# Patient Record
Sex: MALE
[De-identification: names, ages, dates, MRNs, and addresses within clinical notes are randomized per-mention and may not be internally consistent; named-entity substitution may affect disease eponyms.]

---

## 2017-03-03 ENCOUNTER — APPOINTMENT (OUTPATIENT)
Dept: RADIATION ONCOLOGY | Facility: CLINIC | Age: 82
End: 2017-03-03

## 2017-03-03 VITALS
SYSTOLIC BLOOD PRESSURE: 150 MMHG | DIASTOLIC BLOOD PRESSURE: 80 MMHG | OXYGEN SATURATION: 97 % | HEIGHT: 66 IN | WEIGHT: 156.2 LBS | RESPIRATION RATE: 16 BRPM | BODY MASS INDEX: 25.1 KG/M2 | TEMPERATURE: 96.8 F | HEART RATE: 72 BPM

## 2017-03-03 VITALS — DIASTOLIC BLOOD PRESSURE: 70 MMHG | SYSTOLIC BLOOD PRESSURE: 130 MMHG

## 2017-03-06 ENCOUNTER — RESULT REVIEW (OUTPATIENT)
Age: 82
End: 2017-03-06

## 2017-05-03 ENCOUNTER — RESULT REVIEW (OUTPATIENT)
Age: 82
End: 2017-05-03

## 2017-06-14 ENCOUNTER — APPOINTMENT (OUTPATIENT)
Dept: RADIATION ONCOLOGY | Facility: CLINIC | Age: 82
End: 2017-06-14
Payer: MEDICARE

## 2017-06-14 VITALS
DIASTOLIC BLOOD PRESSURE: 72 MMHG | SYSTOLIC BLOOD PRESSURE: 118 MMHG | WEIGHT: 155.29 LBS | HEART RATE: 62 BPM | OXYGEN SATURATION: 95 % | RESPIRATION RATE: 16 BRPM | BODY MASS INDEX: 25.07 KG/M2

## 2017-06-14 PROCEDURE — 99213 OFFICE O/P EST LOW 20 MIN: CPT

## 2017-06-14 RX ORDER — PHENAZOPYRIDINE 200 MG/1
200 TABLET, FILM COATED ORAL 3 TIMES DAILY
Qty: 42 | Refills: 0 | Status: DISCONTINUED | COMMUNITY
Start: 2017-02-14 | End: 2017-06-14

## 2017-09-11 ENCOUNTER — APPOINTMENT (OUTPATIENT)
Dept: RADIATION ONCOLOGY | Facility: CLINIC | Age: 82
End: 2017-09-11
Payer: MEDICARE

## 2017-09-11 VITALS
OXYGEN SATURATION: 97 % | WEIGHT: 156.53 LBS | HEART RATE: 63 BPM | DIASTOLIC BLOOD PRESSURE: 77 MMHG | RESPIRATION RATE: 16 BRPM | SYSTOLIC BLOOD PRESSURE: 138 MMHG | BODY MASS INDEX: 25.26 KG/M2

## 2017-09-11 PROCEDURE — 99213 OFFICE O/P EST LOW 20 MIN: CPT | Mod: GC

## 2017-09-14 LAB — PSA SERPL-MCNC: 0.25 NG/ML

## 2017-12-22 ENCOUNTER — APPOINTMENT (OUTPATIENT)
Dept: UROLOGY | Facility: CLINIC | Age: 82
End: 2017-12-22
Payer: MEDICARE

## 2017-12-22 VITALS
DIASTOLIC BLOOD PRESSURE: 82 MMHG | SYSTOLIC BLOOD PRESSURE: 149 MMHG | TEMPERATURE: 97.8 F | HEART RATE: 73 BPM | RESPIRATION RATE: 16 BRPM

## 2017-12-22 PROCEDURE — 99213 OFFICE O/P EST LOW 20 MIN: CPT

## 2018-03-12 ENCOUNTER — APPOINTMENT (OUTPATIENT)
Dept: RADIATION ONCOLOGY | Facility: CLINIC | Age: 83
End: 2018-03-12

## 2018-03-26 ENCOUNTER — OUTPATIENT (OUTPATIENT)
Dept: OUTPATIENT SERVICES | Facility: HOSPITAL | Age: 83
LOS: 1 days | Discharge: HOME | End: 2018-03-26

## 2018-03-26 ENCOUNTER — APPOINTMENT (OUTPATIENT)
Dept: RADIATION ONCOLOGY | Facility: CLINIC | Age: 83
End: 2018-03-26
Payer: MEDICARE

## 2018-03-26 VITALS
DIASTOLIC BLOOD PRESSURE: 76 MMHG | WEIGHT: 150 LBS | TEMPERATURE: 97.7 F | RESPIRATION RATE: 15 BRPM | BODY MASS INDEX: 24.21 KG/M2 | OXYGEN SATURATION: 98 % | HEART RATE: 68 BPM | SYSTOLIC BLOOD PRESSURE: 154 MMHG

## 2018-03-26 PROCEDURE — 99213 OFFICE O/P EST LOW 20 MIN: CPT | Mod: GC

## 2018-03-26 RX ORDER — HYDROCODONE BITARTRATE AND ACETAMINOPHEN 5; 325 MG/1; MG/1
5-325 TABLET ORAL
Qty: 10 | Refills: 0 | Status: DISCONTINUED | COMMUNITY
Start: 2017-08-16

## 2018-03-28 LAB — PSA SERPL-MCNC: 0.15 NG/ML

## 2018-06-15 ENCOUNTER — APPOINTMENT (OUTPATIENT)
Dept: UROLOGY | Facility: CLINIC | Age: 83
End: 2018-06-15

## 2018-09-12 ENCOUNTER — APPOINTMENT (OUTPATIENT)
Dept: NEUROSURGERY | Facility: CLINIC | Age: 83
End: 2018-09-12
Payer: MEDICARE

## 2018-09-12 DIAGNOSIS — C80.1 MALIGNANT (PRIMARY) NEOPLASM, UNSPECIFIED: ICD-10-CM

## 2018-09-12 PROCEDURE — 99203 OFFICE O/P NEW LOW 30 MIN: CPT

## 2018-09-13 VITALS — DIASTOLIC BLOOD PRESSURE: 80 MMHG | HEART RATE: 68 BPM | RESPIRATION RATE: 18 BRPM | SYSTOLIC BLOOD PRESSURE: 124 MMHG

## 2018-09-13 PROBLEM — C80.1 CANCER: Status: RESOLVED | Noted: 2018-09-13 | Resolved: 2018-09-13

## 2018-09-18 ENCOUNTER — APPOINTMENT (OUTPATIENT)
Dept: RADIATION ONCOLOGY | Facility: CLINIC | Age: 83
End: 2018-09-18
Payer: MEDICARE

## 2018-09-18 VITALS
BODY MASS INDEX: 24.11 KG/M2 | OXYGEN SATURATION: 96 % | DIASTOLIC BLOOD PRESSURE: 76 MMHG | HEIGHT: 66 IN | SYSTOLIC BLOOD PRESSURE: 128 MMHG | RESPIRATION RATE: 18 BRPM | WEIGHT: 150 LBS | HEART RATE: 70 BPM | TEMPERATURE: 98.2 F

## 2018-09-18 PROCEDURE — 99213 OFFICE O/P EST LOW 20 MIN: CPT | Mod: GC

## 2018-09-20 LAB
PSA FREE FLD-MCNC: 11.8
PSA FREE SERPL-MCNC: 0.02 NG/ML
PSA SERPL-MCNC: 0.17 NG/ML

## 2018-10-12 ENCOUNTER — APPOINTMENT (OUTPATIENT)
Dept: UROLOGY | Facility: CLINIC | Age: 83
End: 2018-10-12
Payer: MEDICARE

## 2018-10-12 PROCEDURE — 99213 OFFICE O/P EST LOW 20 MIN: CPT

## 2018-10-17 LAB
APPEARANCE: CLEAR
BACTERIA: NEGATIVE
BILIRUBIN URINE: NEGATIVE
BLOOD URINE: ABNORMAL
COLOR: YELLOW
GLUCOSE QUALITATIVE U: NEGATIVE MG/DL
KETONES URINE: NEGATIVE
LEUKOCYTE ESTERASE URINE: NEGATIVE
MICROSCOPIC-UA: NORMAL
NITRITE URINE: NEGATIVE
PH URINE: 6.5
PROTEIN URINE: NEGATIVE MG/DL
RED BLOOD CELLS URINE: 0 /HPF
SPECIFIC GRAVITY URINE: 1.01
SQUAMOUS EPITHELIAL CELLS: 0 /HPF
UROBILINOGEN URINE: NEGATIVE MG/DL
WHITE BLOOD CELLS URINE: 0 /HPF

## 2019-10-25 ENCOUNTER — APPOINTMENT (OUTPATIENT)
Dept: UROLOGY | Facility: CLINIC | Age: 84
End: 2019-10-25
Payer: MEDICARE

## 2019-10-25 PROCEDURE — 99213 OFFICE O/P EST LOW 20 MIN: CPT

## 2019-10-25 NOTE — ASSESSMENT
Detail Level: Generalized [FreeTextEntry1] : patient with hx of pca\par stable psa : 0.11 with normal urine\par no LUTS\par cont annual f/u  Detail Level: Detailed Detail Level: Zone Detail Level: Simple

## 2019-10-25 NOTE — PHYSICAL EXAM
[General Appearance - Well Developed] : well developed [General Appearance - Well Nourished] : well nourished [Normal Appearance] : normal appearance [Well Groomed] : well groomed [General Appearance - In No Acute Distress] : no acute distress [Abdomen Soft] : soft [Abdomen Tenderness] : non-tender [] : no hepato-splenomegaly [Abdomen Hernia] : no hernia was discovered [Abdomen Mass (___ Cm)] : no abdominal mass palpated [Costovertebral Angle Tenderness] : no ~M costovertebral angle tenderness [FreeTextEntry1] : pvr--zero [Cervical Lymph Nodes Enlarged Posterior Bilaterally] : posterior cervical [Supraclavicular Lymph Nodes Enlarged Bilaterally] : supraclavicular [Cervical Lymph Nodes Enlarged Anterior Bilaterally] : anterior cervical

## 2020-10-19 ENCOUNTER — APPOINTMENT (OUTPATIENT)
Dept: RADIATION ONCOLOGY | Facility: CLINIC | Age: 85
End: 2020-10-19
Payer: MEDICARE

## 2020-10-19 VITALS
HEART RATE: 70 BPM | DIASTOLIC BLOOD PRESSURE: 74 MMHG | RESPIRATION RATE: 16 BRPM | SYSTOLIC BLOOD PRESSURE: 128 MMHG | OXYGEN SATURATION: 97 %

## 2020-10-19 PROCEDURE — 99213 OFFICE O/P EST LOW 20 MIN: CPT | Mod: GC

## 2020-10-19 NOTE — REVIEW OF SYSTEMS
[Nocturia] : nocturia [EPIC-CP Score (0-60): ___] : EPIC-CP score: [unfilled] [IPSS Score (0-40): ___] : IPSS score: [unfilled] [Negative] : Heme/Lymph [Hematuria: Grade 0] : Hematuria: Grade 0 [Urinary Incontinence: Grade 0] : Urinary Incontinence: Grade 0  [Urinary Retention: Grade 0] : Urinary Retention: Grade 0 [Urinary Tract Pain: Grade 0] : Urinary Tract Pain: Grade 0 [Urinary Urgency: Grade 0] : Urinary Urgency: Grade 0 [Erectile Dysfunction: Grade 0] : Erectile Dysfunction: Grade 0 [Ejaculation Disorder: Grade 0] : Ejaculation Disorder: Grade 0 [Urinary Frequency: Grade 0] : Urinary Frequency: Grade 0 [Constipation: Grade 0] : Constipation: Grade 0 [Diarrhea: Grade 0] : Diarrhea: Grade 0

## 2020-10-20 NOTE — DISEASE MANAGEMENT
[1] : T1 [c] : c [0] : M0 [10-20] : 10 - 20 ng/mL [6] : [place] Score 6 [Radiation Therapy] : Radiation Therapy [Clinical] : TNM Stage: c [II] : II [NTNM] : 0 [TTNM] : 1c [MTNM] : 0

## 2020-10-20 NOTE — DATA REVIEWED
[FreeTextEntry1] : PSA (ng/mL)\par \par 10/02/20  0.09\par 6/2019  0.11\par 3/26/18 0.15\par 9/14/17 0.25\par 02/03/17 0.47\par 08/25/16 0.6\par 06/15/16 0.73 \par 09/02/15 3.07.

## 2020-10-20 NOTE — HISTORY OF PRESENT ILLNESS
[FreeTextEntry1] : Mr. Sinha is a 85 year old male with a history of clinical stage II, [id number] adenocarcinoma of the prostate with a Madelyn score of 3+3 and a pretreatment PSA of 11.4 ng/ml. He underwent definitive radiation therapy with SBRT. A dose of 4250cGy was given in five fractions and completed on 8/7/15. \par \par He presents for follow-up, last seen here 2 years ago. His most recent ultrasensitive PSA on 10/2/20 was 0.09 ng/mL (previous .11ng/mL). He is feeling well without urinary frequency, urgency, dysuria or hematuria. His bowels are regular, without rectal pain, bleeding or discharge. Denies erectile dysfunction.

## 2020-10-20 NOTE — PHYSICAL EXAM
[General Appearance - Well Developed] : well developed [Sclera] : the sclera and conjunctiva were normal [Nondistended] : nondistended [] : no respiratory distress [Abdomen Tenderness] : non-tender [Oriented To Time, Place, And Person] : oriented to person, place, and time [Outer Ear] : the ears and nose were normal in appearance [Abdomen Soft] : soft [No Focal Deficits] : no focal deficits [Heart Rate And Rhythm] : heart rate and rhythm were normal [Normal] : no joint swelling, no clubbing or cyanosis of the fingernails and muscle strength and tone were normal

## 2022-10-21 ENCOUNTER — APPOINTMENT (OUTPATIENT)
Dept: INTERNAL MEDICINE | Facility: CLINIC | Age: 87
End: 2022-10-21

## 2022-10-21 VITALS
WEIGHT: 138 LBS | BODY MASS INDEX: 22.18 KG/M2 | HEART RATE: 88 BPM | HEIGHT: 66 IN | OXYGEN SATURATION: 97 % | DIASTOLIC BLOOD PRESSURE: 74 MMHG | SYSTOLIC BLOOD PRESSURE: 136 MMHG

## 2022-10-21 DIAGNOSIS — Z00.00 ENCOUNTER FOR GENERAL ADULT MEDICAL EXAMINATION W/OUT ABNORMAL FINDINGS: ICD-10-CM

## 2022-10-21 PROCEDURE — 36415 COLL VENOUS BLD VENIPUNCTURE: CPT

## 2022-10-21 PROCEDURE — 90662 IIV NO PRSV INCREASED AG IM: CPT

## 2022-10-21 PROCEDURE — G0008: CPT

## 2022-10-21 PROCEDURE — 99397 PER PM REEVAL EST PAT 65+ YR: CPT | Mod: 25

## 2022-10-21 RX ORDER — DENOSUMAB 60 MG/ML
60 INJECTION SUBCUTANEOUS
Qty: 1 | Refills: 0 | Status: ACTIVE | COMMUNITY
Start: 2022-10-21 | End: 1900-01-01

## 2022-10-21 NOTE — HEALTH RISK ASSESSMENT
[Very Good] : ~his/her~  mood as very good [Never] : Never [No] : No [1 or 2 (0 pts)] : 1 or 2 (0 points) [Never (0 pts)] : Never (0 points) [No falls in past year] : Patient reported no falls in the past year [0] : 2) Feeling down, depressed, or hopeless: Not at all (0) [PHQ-2 Negative - No further assessment needed] : PHQ-2 Negative - No further assessment needed [Patient reported bone density results were abnormal] : Patient reported bone density results were abnormal [Patient reported colonoscopy was normal] : Patient reported colonoscopy was normal [HIV Test offered] : HIV Test offered [Hepatitis C test offered] : Hepatitis C test offered [None] : None [With Family] : lives with family [Retired] : retired [High School] : high school [] :  [# Of Children ___] : has [unfilled] children [Feels Safe at Home] : Feels safe at home [Fully functional (bathing, dressing, toileting, transferring, walking, feeding)] : Fully functional (bathing, dressing, toileting, transferring, walking, feeding) [Fully functional (using the telephone, shopping, preparing meals, housekeeping, doing laundry, using] : Fully functional and needs no help or supervision to perform IADLs (using the telephone, shopping, preparing meals, housekeeping, doing laundry, using transportation, managing medications and managing finances) [With Patient/Caregiver] : , with patient/caregiver [Designated Healthcare Proxy] : Designated healthcare proxy [Name: ___] : Health Care Proxy's Name: [unfilled]  [Relationship: ___] : Relationship: [unfilled] [de-identified] : walks regularly [de-identified] : healthy - lots of fruit and veggies - red meat 1x per week  [[id number]] : 0 [Change in mental status noted] : No change in mental status noted [Language] : denies difficulty with language [Reports changes in hearing] : Reports no changes in hearing [Reports changes in vision] : Reports no changes in vision [Reports normal functional visual acuity (ie: able to read med bottle)] : Reports poor functional visual acuity.  [Reports changes in dental health] : Reports no changes in dental health [Smoke Detector] : no smoke detector [Carbon Monoxide Detector] : no carbon monoxide detector [Safety elements used in home] : no safety elements used in home [Seat Belt] : does not use seat belt [TB Exposure] : is not being exposed to tuberculosis [BoneDensityDate] : 6/2022 [BoneDensityComments] : osteoporosis [ColonoscopyDate] : 1/2022 [de-identified] : wife [FreeTextEntry2] :  [de-identified] : uses b/l hearing aids, but can hear well without [de-identified] : s/p cataract surgery [AdvancecareDate] : 10/21/2022

## 2022-10-21 NOTE — HISTORY OF PRESENT ILLNESS
[FreeTextEntry1] : Patient presenting to [facility] [place] care.  [de-identified] : Patient has been in good overall health this past year and has no active complaints.\par All data, labs, consult notes and studies reviewed. \par \par Previous PCP - Katty Chow\par Patient brings records with him from previous PCP. \par \par Reports hx of osteoporosis - brings records from DEXA scan completed in 2022 showing osteoporosis for which he takes CA + vit D.\par Patient reports hx of prostate cancer - for which he followed with Urology (Dr. Mckenzie) and completed a course of RT that was successful. Was told not necessary to follow-up. However, per records - Dr. Vanessa Fournier (Rad/onc) recommended 1 year follow-up, patient to make appt.

## 2022-10-21 NOTE — PLAN
[FreeTextEntry1] : #Prostate CA\par -hx of prostate cancer stage II - previously followed by Urology + Rad/onc s/p RT - last visit with Dr. Fournier in 2020, at which time recommended for 1 year follow-up\par -pt to make f/u appt, referral made\par -will order PSA today for monitoring\par \par #Osteoporosis\par -pt with DEXA scan completed in 2022 showing osteoporosis\par -s/p prolia shot x1, next due 1/2023, will make appt \par -c/w CA/VIT D supplementation\par \par #HCM\par -obtain records from outside PCP, patient reports UTD with all vaccines and HCM\par -flu shot today

## 2022-10-21 NOTE — HISTORY OF PRESENT ILLNESS
[FreeTextEntry1] : Patient presenting to [facility] care.  [de-identified] : Patient has been in good overall health this past year and has no active complaints.\par All data, labs, consult notes and studies reviewed. \par \par Previous PCP - Katty Chow\par Patient brings records with him from previous PCP. \par \par Reports hx of osteoporosis - brings records from DEXA scan completed in 2022 showing osteoporosis for which he takes CA + vit D.\par Patient reports hx of prostate cancer - for which he followed with Urology (Dr. Mckenzie) and completed a course of RT that was successful. Was told not necessary to follow-up. However, per records - Dr. Vanessa Fournier (Rad/onc) recommended 1 year follow-up, patient to make appt.

## 2022-10-21 NOTE — HEALTH RISK ASSESSMENT
[Very Good] : ~his/her~  mood as very good [Never] : Never [No] : No [1 or 2 (0 pts)] : 1 or 2 (0 points) [Never (0 pts)] : Never (0 points) [No falls in past year] : Patient reported no falls in the past year [0] : 2) Feeling down, depressed, or hopeless: Not at all (0) [PHQ-2 Negative - No further assessment needed] : PHQ-2 Negative - No further assessment needed [Patient reported bone density results were abnormal] : Patient reported bone density results were abnormal [Patient reported colonoscopy was normal] : Patient reported colonoscopy was normal [HIV Test offered] : HIV Test offered [Hepatitis C test offered] : Hepatitis C test offered [None] : None [With Family] : lives with family [Retired] : retired [High School] : high school [] :  [# Of Children ___] : has [unfilled] children [Feels Safe at Home] : Feels safe at home [Fully functional (bathing, dressing, toileting, transferring, walking, feeding)] : Fully functional (bathing, dressing, toileting, transferring, walking, feeding) [Fully functional (using the telephone, shopping, preparing meals, housekeeping, doing laundry, using] : Fully functional and needs no help or supervision to perform IADLs (using the telephone, shopping, preparing meals, housekeeping, doing laundry, using transportation, managing medications and managing finances) [With Patient/Caregiver] : , with patient/caregiver [Designated Healthcare Proxy] : Designated healthcare proxy [Name: ___] : Health Care Proxy's Name: [unfilled]  [Relationship: ___] : Relationship: [unfilled] [de-identified] : walks regularly [de-identified] : healthy - lots of fruit and veggies - red meat 1x per week  [[id number]] : 0 [Change in mental status noted] : No change in mental status noted [Language] : denies difficulty with language [Reports changes in hearing] : Reports no changes in hearing [Reports changes in vision] : Reports no changes in vision [Reports normal functional visual acuity (ie: able to read med bottle)] : Reports poor functional visual acuity.  [Reports changes in dental health] : Reports no changes in dental health [Smoke Detector] : no smoke detector [Carbon Monoxide Detector] : no carbon monoxide detector [Safety elements used in home] : no safety elements used in home [Seat Belt] : does not use seat belt [TB Exposure] : is not being exposed to tuberculosis [BoneDensityDate] : 6/2022 [BoneDensityComments] : osteoporosis [ColonoscopyDate] : 1/2022 [de-identified] : wife [FreeTextEntry2] :  [de-identified] : uses b/l hearing aids, but can hear well without [de-identified] : s/p cataract surgery [AdvancecareDate] : 10/21/2022

## 2022-10-24 LAB
25(OH)D3 SERPL-MCNC: 57.1 NG/ML
ALBUMIN SERPL ELPH-MCNC: 3.9 G/DL
ALP BLD-CCNC: 81 U/L
ALT SERPL-CCNC: 42 U/L
ANION GAP SERPL CALC-SCNC: 13 MMOL/L
AST SERPL-CCNC: 33 U/L
BASOPHILS # BLD AUTO: 0.02 K/UL
BASOPHILS NFR BLD AUTO: 0.3 %
BILIRUB SERPL-MCNC: 0.5 MG/DL
BUN SERPL-MCNC: 16 MG/DL
CALCIUM SERPL-MCNC: 8.9 MG/DL
CHLORIDE SERPL-SCNC: 101 MMOL/L
CHOLEST SERPL-MCNC: 164 MG/DL
CO2 SERPL-SCNC: 23 MMOL/L
CREAT SERPL-MCNC: 0.8 MG/DL
EGFR: 86 ML/MIN/1.73M2
EOSINOPHIL # BLD AUTO: 0.29 K/UL
EOSINOPHIL NFR BLD AUTO: 4 %
ESTIMATED AVERAGE GLUCOSE: 126 MG/DL
GLUCOSE SERPL-MCNC: 107 MG/DL
HBA1C MFR BLD HPLC: 6 %
HCT VFR BLD CALC: 42.7 %
HCV AB SER QL: NONREACTIVE
HCV S/CO RATIO: 0.15 S/CO
HDLC SERPL-MCNC: 40 MG/DL
HGB BLD-MCNC: 14.3 G/DL
HIV1+2 AB SPEC QL IA.RAPID: NONREACTIVE
IMM GRANULOCYTES NFR BLD AUTO: 0.1 %
LDLC SERPL CALC-MCNC: 109 MG/DL
LYMPHOCYTES # BLD AUTO: 1.27 K/UL
LYMPHOCYTES NFR BLD AUTO: 17.6 %
MAN DIFF?: NORMAL
MCHC RBC-ENTMCNC: 32.4 PG
MCHC RBC-ENTMCNC: 33.5 GM/DL
MCV RBC AUTO: 96.6 FL
MONOCYTES # BLD AUTO: 0.56 K/UL
MONOCYTES NFR BLD AUTO: 7.8 %
NEUTROPHILS # BLD AUTO: 5.06 K/UL
NEUTROPHILS NFR BLD AUTO: 70.2 %
NONHDLC SERPL-MCNC: 123 MG/DL
PLATELET # BLD AUTO: 213 K/UL
POTASSIUM SERPL-SCNC: 4.1 MMOL/L
PROT SERPL-MCNC: 7.5 G/DL
PSA SERPL-MCNC: 0.13 NG/ML
RBC # BLD: 4.42 M/UL
RBC # FLD: 12 %
SODIUM SERPL-SCNC: 138 MMOL/L
TRIGL SERPL-MCNC: 73 MG/DL
TSH SERPL-ACNC: 2.62 UIU/ML
WBC # FLD AUTO: 7.21 K/UL

## 2022-11-14 ENCOUNTER — APPOINTMENT (OUTPATIENT)
Dept: RADIATION ONCOLOGY | Facility: CLINIC | Age: 87
End: 2022-11-14

## 2022-11-14 VITALS
DIASTOLIC BLOOD PRESSURE: 72 MMHG | SYSTOLIC BLOOD PRESSURE: 148 MMHG | BODY MASS INDEX: 22.48 KG/M2 | WEIGHT: 139.88 LBS | TEMPERATURE: 97.7 F | HEART RATE: 75 BPM | RESPIRATION RATE: 17 BRPM | OXYGEN SATURATION: 97 % | HEIGHT: 66 IN

## 2022-11-14 PROCEDURE — 99212 OFFICE O/P EST SF 10 MIN: CPT

## 2022-11-14 NOTE — REVIEW OF SYSTEMS
[Nocturia] : nocturia [Negative] : Allergic/Immunologic [Constipation: Grade 0] : Constipation: Grade 0 [Diarrhea: Grade 0] : Diarrhea: Grade 0 [Hematuria: Grade 0] : Hematuria: Grade 0 [Urinary Incontinence: Grade 0] : Urinary Incontinence: Grade 0  [Urinary Retention: Grade 0] : Urinary Retention: Grade 0 [Urinary Tract Pain: Grade 0] : Urinary Tract Pain: Grade 0 [Urinary Urgency: Grade 0] : Urinary Urgency: Grade 0 [Urinary Frequency: Grade 0] : Urinary Frequency: Grade 0 [Ejaculation Disorder: Grade 0] : Ejaculation Disorder: Grade 0 [Erectile Dysfunction: Grade 0] : Erectile Dysfunction: Grade 0 [EPIC-CP Score (0-60): ___] : EPIC-CP score: [unfilled]

## 2022-11-16 NOTE — DISEASE MANAGEMENT
[Clinical] : TNM Stage: c [II] : II [1] : T1 [c] : c [0] : M0 [10-20] : 10 - 20 ng/mL [6] : [place] Score 6 [Radiation Therapy] : Radiation Therapy [TTNM] : 1c [NTNM] : 0 [MTNM] : 0

## 2022-11-16 NOTE — DATA REVIEWED
[FreeTextEntry1] : PSA (ng/mL)\par \par 10/21/22  0.13\par 10/02/20  0.09\par 6/2019  0.11\par 3/26/18 0.15\par 9/14/17 0.25\par 02/03/17 0.47\par 08/25/16 0.6\par 06/15/16 0.73 \par 09/02/15 3.07.

## 2022-11-16 NOTE — HISTORY OF PRESENT ILLNESS
[FreeTextEntry1] : Mr. Sinha is a 87 year old male with a history of clinical stage II, [id number] adenocarcinoma of the prostate with a Madelyn score of 3+3 and a pretreatment PSA of 11.4 ng/ml. He underwent definitive radiation therapy with SBRT. A dose of 4250cGy was given in five fractions and completed on 8/7/15. \par \par He presents for follow-up, last seen here 2 years ago. He stayed home due to COVID and was recently encouraged to follow up by his PMD. He is feeling well. He is involved in all his normal activities. He denies pain or discomfort.  He has no dysuria, hematuria or incontinence. The bowel movements are regular, and there is no rectal pain, bleeding or discharge.  He denies erectile dysfunction. \par \par  His most recent PSA was on 10/21/22 was 0.13 ng/mL (previous 0.17 ng/mL).

## 2022-11-16 NOTE — PHYSICAL EXAM
[] : no respiratory distress [Nondistended] : nondistended [Abdomen Tenderness] : non-tender [No Focal Deficits] : no focal deficits [Normal] : oriented to person, place and time, the affect was normal, the mood was normal and not anxious [Sclera] : the sclera and conjunctiva were normal [Heart Rate And Rhythm] : heart rate and rhythm were normal [Abdomen Soft] : soft

## 2023-01-13 ENCOUNTER — APPOINTMENT (OUTPATIENT)
Dept: INTERNAL MEDICINE | Facility: CLINIC | Age: 88
End: 2023-01-13
Payer: MEDICARE

## 2023-01-13 LAB
ALBUMIN SERPL ELPH-MCNC: 4 G/DL
ALP BLD-CCNC: 54 U/L
ALT SERPL-CCNC: 22 U/L
ANION GAP SERPL CALC-SCNC: 9 MMOL/L
AST SERPL-CCNC: 30 U/L
BILIRUB SERPL-MCNC: 0.5 MG/DL
BUN SERPL-MCNC: 22 MG/DL
CALCIUM SERPL-MCNC: 9.1 MG/DL
CHLORIDE SERPL-SCNC: 107 MMOL/L
CO2 SERPL-SCNC: 27 MMOL/L
CREAT SERPL-MCNC: 0.94 MG/DL
EGFR: 79 ML/MIN/1.73M2
GLUCOSE SERPL-MCNC: 97 MG/DL
POTASSIUM SERPL-SCNC: 4.5 MMOL/L
PROT SERPL-MCNC: 7.1 G/DL
SODIUM SERPL-SCNC: 142 MMOL/L

## 2023-01-13 PROCEDURE — 36415 COLL VENOUS BLD VENIPUNCTURE: CPT

## 2023-01-17 ENCOUNTER — APPOINTMENT (OUTPATIENT)
Dept: INTERNAL MEDICINE | Facility: CLINIC | Age: 88
End: 2023-01-17

## 2023-01-27 ENCOUNTER — APPOINTMENT (OUTPATIENT)
Dept: INTERNAL MEDICINE | Facility: CLINIC | Age: 88
End: 2023-01-27
Payer: MEDICARE

## 2023-01-27 VITALS
HEIGHT: 66 IN | WEIGHT: 140 LBS | SYSTOLIC BLOOD PRESSURE: 130 MMHG | DIASTOLIC BLOOD PRESSURE: 70 MMHG | TEMPERATURE: 97 F | OXYGEN SATURATION: 97 % | HEART RATE: 83 BPM | BODY MASS INDEX: 22.5 KG/M2

## 2023-01-27 PROCEDURE — 99214 OFFICE O/P EST MOD 30 MIN: CPT | Mod: 25

## 2023-01-27 NOTE — HISTORY OF PRESENT ILLNESS
[FreeTextEntry1] : Patient presents today for follow-up of chronic medical conditions.\par  [de-identified] : Patient presents today for follow-up of chronic medical conditions.\par Prolia shot today\par \par Patient has been well and has no active complaints.\par

## 2023-01-27 NOTE — ASSESSMENT
[FreeTextEntry1] : #Prostate CA\par -hx of prostate cancer stage II - previously followed by Urology + Rad/onc s/p RT - last visit with Dr. Fournier 2023\par -recommended for 1 year TEB f/u\par \par #Osteoporosis\par -pt with DEXA scan completed in 2022 showing osteoporosis - repeat [id number]\par -s/p prolia (started 2022 - last dose 1/2023) will make appt q6months \par -c/w CA/VIT D supplementation\par \par #ED \par tadalafil rx sent\par \par #HCM\par UTD \par \par \par

## 2023-07-25 ENCOUNTER — APPOINTMENT (OUTPATIENT)
Dept: INTERNAL MEDICINE | Facility: CLINIC | Age: 88
End: 2023-07-25
Payer: MEDICARE

## 2023-07-25 VITALS
SYSTOLIC BLOOD PRESSURE: 120 MMHG | HEART RATE: 68 BPM | WEIGHT: 137 LBS | BODY MASS INDEX: 22.02 KG/M2 | TEMPERATURE: 97.8 F | HEIGHT: 66 IN | OXYGEN SATURATION: 98 % | DIASTOLIC BLOOD PRESSURE: 72 MMHG

## 2023-07-25 LAB
25(OH)D3 SERPL-MCNC: 43 NG/ML
ALBUMIN SERPL ELPH-MCNC: 4.1 G/DL
ALP BLD-CCNC: 51 U/L
ALT SERPL-CCNC: 20 U/L
ANION GAP SERPL CALC-SCNC: 10 MMOL/L
AST SERPL-CCNC: 30 U/L
BILIRUB SERPL-MCNC: 0.4 MG/DL
BUN SERPL-MCNC: 20 MG/DL
CALCIUM SERPL-MCNC: 9.4 MG/DL
CHLORIDE SERPL-SCNC: 103 MMOL/L
CHOLEST SERPL-MCNC: 179 MG/DL
CO2 SERPL-SCNC: 25 MMOL/L
CREAT SERPL-MCNC: 0.89 MG/DL
EGFR: 83 ML/MIN/1.73M2
ESTIMATED AVERAGE GLUCOSE: 114 MG/DL
GLUCOSE SERPL-MCNC: 100 MG/DL
HBA1C MFR BLD HPLC: 5.6 %
HDLC SERPL-MCNC: 50 MG/DL
LDLC SERPL CALC-MCNC: 116 MG/DL
NONHDLC SERPL-MCNC: 129 MG/DL
POTASSIUM SERPL-SCNC: 3.9 MMOL/L
PROT SERPL-MCNC: 6.9 G/DL
PSA SERPL-MCNC: 0.13 NG/ML
SODIUM SERPL-SCNC: 138 MMOL/L
TRIGL SERPL-MCNC: 67 MG/DL

## 2023-07-25 PROCEDURE — 36415 COLL VENOUS BLD VENIPUNCTURE: CPT

## 2023-07-25 PROCEDURE — 99214 OFFICE O/P EST MOD 30 MIN: CPT | Mod: 25

## 2023-07-25 NOTE — ASSESSMENT
[FreeTextEntry1] : #Prostate CA\par -hx of prostate cancer stage II - previously followed by Urology + Rad/onc s/p RT - last visit with Dr. Fournier 2023\par -recommended for 1 year TEB f/u\par \par #Osteoporosis\par -pt with DEXA scan completed in 2022 showing osteoporosis - annual\par -s/p prolia (started 2022 - last dose 1/2023) will make appt q6months \par -c/w CA/VIT D supplementation\par \par #ED \par tadalafil rx sent\par \par #HCM\par UTD \par \par \par

## 2023-07-25 NOTE — HISTORY OF PRESENT ILLNESS
[FreeTextEntry1] : Patient presents today for follow-up of chronic medical conditions.\par  [de-identified] : Patient presents today for follow-up of chronic medical conditions.\par \par Patient has been well and has no active complaints.\par Has lost 3 lbs - but reports good appetite

## 2023-07-26 ENCOUNTER — APPOINTMENT (OUTPATIENT)
Dept: INTERNAL MEDICINE | Facility: CLINIC | Age: 88
End: 2023-07-26
Payer: MEDICARE

## 2023-07-26 VITALS
RESPIRATION RATE: 18 BRPM | WEIGHT: 137 LBS | HEIGHT: 66 IN | DIASTOLIC BLOOD PRESSURE: 64 MMHG | SYSTOLIC BLOOD PRESSURE: 134 MMHG | HEART RATE: 77 BPM | BODY MASS INDEX: 22.02 KG/M2 | OXYGEN SATURATION: 97 % | TEMPERATURE: 98.1 F

## 2023-07-26 DIAGNOSIS — Z00.00 ENCOUNTER FOR GENERAL ADULT MEDICAL EXAMINATION W/OUT ABNORMAL FINDINGS: ICD-10-CM

## 2023-07-26 PROCEDURE — 99214 OFFICE O/P EST MOD 30 MIN: CPT | Mod: 25

## 2023-07-26 NOTE — ASSESSMENT
[FreeTextEntry1] : #Prostate CA\par -hx of prostate cancer stage II - previously followed by Urology + Rad/onc s/p RT - last visit with Dr. Fournier 2023\par -recommended for 1 year TEB f/u\par \par #Osteoporosis\par -pt with DEXA scan completed in 2022 showing osteoporosis - repeat annually \par -s/p prolia (started 2022 - last dose 7/26/2023) will make appt q6months \par -c/w CA/VIT D supplementation\par \par #ED \par tadalafil rx sent\par \par #HCM\par UTD \par \par \par

## 2023-07-26 NOTE — HISTORY OF PRESENT ILLNESS
[FreeTextEntry1] : Patient presents today for follow-up of chronic medical conditions.\par  [de-identified] : Patient presents today for follow-up of chronic medical conditions.\par \par Patient has been well and has no active complaints.\par Has lost 3 lbs - but reports good appetite \par \par Prolia shot today

## 2023-07-27 ENCOUNTER — MED ADMIN CHARGE (OUTPATIENT)
Age: 88
End: 2023-07-27

## 2023-08-11 RX ORDER — DENOSUMAB 60 MG/ML
60 INJECTION SUBCUTANEOUS
Qty: 1 | Refills: 0 | Status: COMPLETED | OUTPATIENT
Start: 2023-08-11

## 2023-08-16 ENCOUNTER — APPOINTMENT (OUTPATIENT)
Dept: RADIOLOGY | Facility: CLINIC | Age: 88
End: 2023-08-16
Payer: MEDICARE

## 2023-08-16 PROCEDURE — 77085 DXA BONE DENSITY AXL VRT FX: CPT

## 2024-01-09 ENCOUNTER — APPOINTMENT (OUTPATIENT)
Dept: INTERNAL MEDICINE | Facility: CLINIC | Age: 89
End: 2024-01-09

## 2024-01-16 ENCOUNTER — LABORATORY RESULT (OUTPATIENT)
Age: 89
End: 2024-01-16

## 2024-01-16 ENCOUNTER — APPOINTMENT (OUTPATIENT)
Dept: INTERNAL MEDICINE | Facility: CLINIC | Age: 89
End: 2024-01-16
Payer: MEDICARE

## 2024-01-16 PROCEDURE — 36415 COLL VENOUS BLD VENIPUNCTURE: CPT

## 2024-01-17 LAB
25(OH)D3 SERPL-MCNC: 47.2 NG/ML
ALBUMIN SERPL ELPH-MCNC: 3.8 G/DL
ALP BLD-CCNC: 53 U/L
ALT SERPL-CCNC: 19 U/L
ANION GAP SERPL CALC-SCNC: 13 MMOL/L
AST SERPL-CCNC: 32 U/L
BILIRUB SERPL-MCNC: 0.4 MG/DL
BUN SERPL-MCNC: 23 MG/DL
CALCIUM SERPL-MCNC: 9 MG/DL
CHLORIDE SERPL-SCNC: 105 MMOL/L
CHOLEST SERPL-MCNC: 191 MG/DL
CO2 SERPL-SCNC: 22 MMOL/L
CREAT SERPL-MCNC: 0.92 MG/DL
EGFR: 81 ML/MIN/1.73M2
ESTIMATED AVERAGE GLUCOSE: 114 MG/DL
GLUCOSE SERPL-MCNC: 90 MG/DL
HBA1C MFR BLD HPLC: 5.6 %
HDLC SERPL-MCNC: 48 MG/DL
LDLC SERPL CALC-MCNC: 128 MG/DL
NONHDLC SERPL-MCNC: 143 MG/DL
POTASSIUM SERPL-SCNC: 3.8 MMOL/L
PROT SERPL-MCNC: 7.1 G/DL
PSA SERPL-MCNC: 0.19 NG/ML
SODIUM SERPL-SCNC: 140 MMOL/L
TRIGL SERPL-MCNC: 82 MG/DL
TSH SERPL-ACNC: 4.35 UIU/ML

## 2024-01-27 ENCOUNTER — APPOINTMENT (OUTPATIENT)
Dept: INTERNAL MEDICINE | Facility: CLINIC | Age: 89
End: 2024-01-27
Payer: MEDICARE

## 2024-01-27 VITALS
HEART RATE: 69 BPM | DIASTOLIC BLOOD PRESSURE: 72 MMHG | HEIGHT: 66 IN | SYSTOLIC BLOOD PRESSURE: 132 MMHG | WEIGHT: 140 LBS | TEMPERATURE: 97.6 F | OXYGEN SATURATION: 96 % | BODY MASS INDEX: 22.5 KG/M2

## 2024-01-27 DIAGNOSIS — M81.0 AGE-RELATED OSTEOPOROSIS W/OUT CURRENT PATHOLOGICAL FRACTURE: ICD-10-CM

## 2024-01-27 DIAGNOSIS — N52.9 MALE ERECTILE DYSFUNCTION, UNSPECIFIED: ICD-10-CM

## 2024-01-27 DIAGNOSIS — I67.1 CEREBRAL ANEURYSM, NONRUPTURED: ICD-10-CM

## 2024-01-27 DIAGNOSIS — N40.1 BENIGN PROSTATIC HYPERPLASIA WITH LOWER URINARY TRACT SYMPMS: ICD-10-CM

## 2024-01-27 DIAGNOSIS — C61 MALIGNANT NEOPLASM OF PROSTATE: ICD-10-CM

## 2024-01-27 DIAGNOSIS — N40.0 BENIGN PROSTATIC HYPERPLASIA WITHOUT LOWER URINARY TRACT SYMPMS: ICD-10-CM

## 2024-01-27 PROCEDURE — 99215 OFFICE O/P EST HI 40 MIN: CPT | Mod: 25

## 2024-01-27 PROCEDURE — G0439: CPT

## 2024-01-27 PROCEDURE — G0444 DEPRESSION SCREEN ANNUAL: CPT | Mod: 59

## 2024-01-27 PROCEDURE — 93000 ELECTROCARDIOGRAM COMPLETE: CPT | Mod: 59

## 2024-01-27 PROCEDURE — 96401 CHEMO ANTI-NEOPL SQ/IM: CPT

## 2024-01-27 PROCEDURE — G2211 COMPLEX E/M VISIT ADD ON: CPT | Mod: NC,1L

## 2024-01-27 RX ORDER — OMEPRAZOLE 20 MG/1
20 CAPSULE, DELAYED RELEASE ORAL
Qty: 90 | Refills: 3 | Status: ACTIVE | COMMUNITY
Start: 2024-01-27 | End: 1900-01-01

## 2024-01-27 RX ADMIN — DENOSUMAB 0 MG/ML: 60 INJECTION SUBCUTANEOUS at 00:00

## 2024-01-29 RX ORDER — LIDOCAINE 5 G/100G
5 OINTMENT TOPICAL
Qty: 1 | Refills: 0 | Status: ACTIVE | COMMUNITY
Start: 2024-01-29 | End: 1900-01-01

## 2024-01-30 ENCOUNTER — NON-APPOINTMENT (OUTPATIENT)
Age: 89
End: 2024-01-30

## 2024-02-20 RX ORDER — TADALAFIL 10 MG/1
10 TABLET, FILM COATED ORAL
Qty: 30 | Refills: 5 | Status: ACTIVE | COMMUNITY
Start: 2023-01-27 | End: 1900-01-01

## 2024-03-03 RX ORDER — GLUCOSAMINE HCL/CHONDROITIN SU 500-400 MG
500-15 CAPSULE ORAL
Qty: 90 | Refills: 3 | Status: ACTIVE | COMMUNITY
Start: 2023-01-18 | End: 1900-01-01

## 2024-05-16 NOTE — END OF VISIT
[Time Spent: ___ minutes] : I have spent [unfilled] minutes of time on the encounter.
Do not drive or operate machinery/Do not make important decisions

## 2024-08-06 ENCOUNTER — APPOINTMENT (OUTPATIENT)
Dept: INTERNAL MEDICINE | Facility: CLINIC | Age: 89
End: 2024-08-06

## 2024-08-06 LAB
BASOPHILS # BLD AUTO: 0.03 K/UL
BASOPHILS NFR BLD AUTO: 0.6 %
EOSINOPHIL # BLD AUTO: 0.27 K/UL
EOSINOPHIL NFR BLD AUTO: 5.3 %
HCT VFR BLD CALC: 40.3 %
HGB BLD-MCNC: 12.8 G/DL
IMM GRANULOCYTES NFR BLD AUTO: 0.2 %
LYMPHOCYTES # BLD AUTO: 1.71 K/UL
LYMPHOCYTES NFR BLD AUTO: 33.6 %
MAN DIFF?: NORMAL
MCHC RBC-ENTMCNC: 31.5 PG
MCHC RBC-ENTMCNC: 31.8 GM/DL
MCV RBC AUTO: 99.3 FL
MONOCYTES # BLD AUTO: 0.52 K/UL
MONOCYTES NFR BLD AUTO: 10.2 %
NEUTROPHILS # BLD AUTO: 2.55 K/UL
NEUTROPHILS NFR BLD AUTO: 50.1 %
PLATELET # BLD AUTO: 191 K/UL
PSA SERPL-MCNC: 0.18 NG/ML
RBC # BLD: 4.06 M/UL
RBC # FLD: 13.4 %
WBC # FLD AUTO: 5.09 K/UL

## 2024-08-06 PROCEDURE — 36415 COLL VENOUS BLD VENIPUNCTURE: CPT

## 2024-08-09 LAB
ALBUMIN SERPL ELPH-MCNC: 3.8 G/DL
ALP BLD-CCNC: 54 U/L
ALT SERPL-CCNC: 24 U/L
ANION GAP SERPL CALC-SCNC: 11 MMOL/L
AST SERPL-CCNC: 34 U/L
BILIRUB SERPL-MCNC: 0.4 MG/DL
BUN SERPL-MCNC: 19 MG/DL
CALCIUM SERPL-MCNC: 9.3 MG/DL
CHLORIDE SERPL-SCNC: 106 MMOL/L
CHOLEST SERPL-MCNC: 176 MG/DL
CO2 SERPL-SCNC: 25 MMOL/L
CREAT SERPL-MCNC: 1 MG/DL
EGFR: 72 ML/MIN/1.73M2
GLUCOSE SERPL-MCNC: 104 MG/DL
HDLC SERPL-MCNC: 49 MG/DL
LDLC SERPL CALC-MCNC: 114 MG/DL
NONHDLC SERPL-MCNC: 127 MG/DL
POTASSIUM SERPL-SCNC: 4 MMOL/L
PROT SERPL-MCNC: 6.7 G/DL
SODIUM SERPL-SCNC: 142 MMOL/L
TRIGL SERPL-MCNC: 68 MG/DL

## 2024-08-12 ENCOUNTER — APPOINTMENT (OUTPATIENT)
Dept: INTERNAL MEDICINE | Facility: CLINIC | Age: 89
End: 2024-08-12

## 2024-08-12 VITALS
WEIGHT: 140 LBS | SYSTOLIC BLOOD PRESSURE: 164 MMHG | OXYGEN SATURATION: 97 % | HEART RATE: 85 BPM | TEMPERATURE: 98.3 F | BODY MASS INDEX: 22.5 KG/M2 | DIASTOLIC BLOOD PRESSURE: 74 MMHG | HEIGHT: 66 IN

## 2024-08-12 DIAGNOSIS — C61 MALIGNANT NEOPLASM OF PROSTATE: ICD-10-CM

## 2024-08-12 DIAGNOSIS — N52.9 MALE ERECTILE DYSFUNCTION, UNSPECIFIED: ICD-10-CM

## 2024-08-12 DIAGNOSIS — M81.0 AGE-RELATED OSTEOPOROSIS W/OUT CURRENT PATHOLOGICAL FRACTURE: ICD-10-CM

## 2024-08-12 DIAGNOSIS — I67.1 CEREBRAL ANEURYSM, NONRUPTURED: ICD-10-CM

## 2024-08-12 DIAGNOSIS — N40.0 BENIGN PROSTATIC HYPERPLASIA WITHOUT LOWER URINARY TRACT SYMPMS: ICD-10-CM

## 2024-08-12 DIAGNOSIS — N40.1 BENIGN PROSTATIC HYPERPLASIA WITH LOWER URINARY TRACT SYMPMS: ICD-10-CM

## 2024-08-12 PROCEDURE — 99215 OFFICE O/P EST HI 40 MIN: CPT | Mod: 25

## 2024-08-12 PROCEDURE — 96401 CHEMO ANTI-NEOPL SQ/IM: CPT

## 2024-08-12 RX ADMIN — DENOSUMAB 0 MG/ML: 60 INJECTION SUBCUTANEOUS at 00:00

## 2024-08-12 NOTE — HISTORY OF PRESENT ILLNESS
[FreeTextEntry1] : Patient presents today for follow-up of chronic medical conditions.  [de-identified] : Patient presents today for follow-up of chronic medical conditions.  Patient has been well and has no active complaints. Some acid reflux when drinking orange juice - takes omeprazole prn  Patient presents for prolia injection. Tolerating well. No thigh pain. No interval fx. Normal Ca.  Last DDS within past 6 months. No ONJ. Not planning major dental work.  BMD 8/2023 indicates improvement/stability

## 2024-08-12 NOTE — HEALTH RISK ASSESSMENT
[Very Good] : ~his/her~  mood as very good [No] : No [1 or 2 (0 pts)] : 1 or 2 (0 points) [Never (0 pts)] : Never (0 points) [No falls in past year] : Patient reported no falls in the past year [0] : 2) Feeling down, depressed, or hopeless: Not at all (0) [PHQ-2 Negative - No further assessment needed] : PHQ-2 Negative - No further assessment needed [With Patient/Caregiver] : , with patient/caregiver [Designated Healthcare Proxy] : Designated healthcare proxy [Name: ___] : Health Care Proxy's Name: [unfilled]  [Relationship: ___] : Relationship: [unfilled] [Never] : Never [Patient reported bone density results were abnormal] : Patient reported bone density results were abnormal [Patient reported colonoscopy was normal] : Patient reported colonoscopy was normal [HIV Test offered] : HIV Test offered [Hepatitis C test offered] : Hepatitis C test offered [None] : None [With Family] : lives with family [Retired] : retired [High School] : high school [] :  [# Of Children ___] : has [unfilled] children [Feels Safe at Home] : Feels safe at home [Fully functional (bathing, dressing, toileting, transferring, walking, feeding)] : Fully functional (bathing, dressing, toileting, transferring, walking, feeding) [Fully functional (using the telephone, shopping, preparing meals, housekeeping, doing laundry, using] : Fully functional and needs no help or supervision to perform IADLs (using the telephone, shopping, preparing meals, housekeeping, doing laundry, using transportation, managing medications and managing finances) [de-identified] : walks regularly [de-identified] : healthy - lots of fruit and veggies - red meat 1x per week  [[id number]] : 0 [AdvancecareDate] : 10/21/2022 [Change in mental status noted] : No change in mental status noted [Language] : denies difficulty with language [Reports changes in hearing] : Reports no changes in hearing [Reports changes in vision] : Reports no changes in vision [Reports normal functional visual acuity (ie: able to read med bottle)] : Reports poor functional visual acuity.  [Reports changes in dental health] : Reports no changes in dental health [Smoke Detector] : no smoke detector [Carbon Monoxide Detector] : no carbon monoxide detector [Safety elements used in home] : no safety elements used in home [Seat Belt] : does not use seat belt [TB Exposure] : is not being exposed to tuberculosis [BoneDensityDate] : 8/2023 [BoneDensityComments] : osteoporosis [ColonoscopyDate] : 1/2022 [de-identified] : wife [FreeTextEntry2] :  [de-identified] : uses b/l hearing aids, but can hear well without [de-identified] : s/p cataract surgery

## 2024-08-12 NOTE — ASSESSMENT
[FreeTextEntry1] : #Prostate CA -hx of prostate cancer stage II - previously followed by Urology + Rad/onc s/p RT  #Osteoporosis -pt with DEXA scan completed in 2022 showing osteoporosis - repeat annually  Treatment regimen: Switched to Prolia 10/2022  First injection - 10/2022 Last injection - 8/2024 BMD 8/2023 indicates improvement/stability  [ ] repeat bone density due 8/2024 - will submit for insurance authorization   #ED  tadalafil rx sent  #HCM UTD

## 2024-10-02 ENCOUNTER — APPOINTMENT (OUTPATIENT)
Dept: INTERNAL MEDICINE | Facility: CLINIC | Age: 89
End: 2024-10-02
Payer: MEDICARE

## 2024-10-02 VITALS
SYSTOLIC BLOOD PRESSURE: 132 MMHG | DIASTOLIC BLOOD PRESSURE: 70 MMHG | HEIGHT: 66 IN | WEIGHT: 142 LBS | OXYGEN SATURATION: 97 % | HEART RATE: 67 BPM | BODY MASS INDEX: 22.82 KG/M2

## 2024-10-02 DIAGNOSIS — N40.0 BENIGN PROSTATIC HYPERPLASIA WITHOUT LOWER URINARY TRACT SYMPMS: ICD-10-CM

## 2024-10-02 DIAGNOSIS — M81.0 AGE-RELATED OSTEOPOROSIS W/OUT CURRENT PATHOLOGICAL FRACTURE: ICD-10-CM

## 2024-10-02 DIAGNOSIS — C61 MALIGNANT NEOPLASM OF PROSTATE: ICD-10-CM

## 2024-10-02 DIAGNOSIS — M71.9 BURSOPATHY, UNSPECIFIED: ICD-10-CM

## 2024-10-02 DIAGNOSIS — N40.1 BENIGN PROSTATIC HYPERPLASIA WITH LOWER URINARY TRACT SYMPMS: ICD-10-CM

## 2024-10-02 PROCEDURE — G2211 COMPLEX E/M VISIT ADD ON: CPT

## 2024-10-02 PROCEDURE — 90662 IIV NO PRSV INCREASED AG IM: CPT

## 2024-10-02 PROCEDURE — G0008: CPT

## 2024-10-02 PROCEDURE — 99214 OFFICE O/P EST MOD 30 MIN: CPT

## 2024-10-02 RX ORDER — IBUPROFEN 800 MG/1
800 TABLET, FILM COATED ORAL
Qty: 30 | Refills: 0 | Status: ACTIVE | COMMUNITY
Start: 2024-10-02 | End: 1900-01-01

## 2024-10-02 NOTE — HISTORY OF PRESENT ILLNESS
[FreeTextEntry1] : Patient presents today for follow-up of chronic medical conditions.  [de-identified] : Patient presents today for follow-up of chronic medical conditions.  Reports knee pain on R side started a few days ago  Denies trauma  Some acid reflux when drinking orange juice - takes omeprazole prn  Patient presents for prolia injection. Tolerating well. No thigh pain. No interval fx. Normal Ca.  Last DDS within past 6 months. No ONJ. Not planning major dental work.  BMD 8/2023 indicates improvement/stability

## 2024-10-02 NOTE — HEALTH RISK ASSESSMENT
[Very Good] : ~his/her~  mood as very good [No] : No [1 or 2 (0 pts)] : 1 or 2 (0 points) [Never (0 pts)] : Never (0 points) [No falls in past year] : Patient reported no falls in the past year [0] : 2) Feeling down, depressed, or hopeless: Not at all (0) [PHQ-2 Negative - No further assessment needed] : PHQ-2 Negative - No further assessment needed [With Patient/Caregiver] : , with patient/caregiver [Designated Healthcare Proxy] : Designated healthcare proxy [Name: ___] : Health Care Proxy's Name: [unfilled]  [Relationship: ___] : Relationship: [unfilled] [Never] : Never [Patient reported bone density results were abnormal] : Patient reported bone density results were abnormal [Patient reported colonoscopy was normal] : Patient reported colonoscopy was normal [HIV Test offered] : HIV Test offered [Hepatitis C test offered] : Hepatitis C test offered [None] : None [With Family] : lives with family [Retired] : retired [High School] : high school [] :  [# Of Children ___] : has [unfilled] children [Feels Safe at Home] : Feels safe at home [Fully functional (bathing, dressing, toileting, transferring, walking, feeding)] : Fully functional (bathing, dressing, toileting, transferring, walking, feeding) [Fully functional (using the telephone, shopping, preparing meals, housekeeping, doing laundry, using] : Fully functional and needs no help or supervision to perform IADLs (using the telephone, shopping, preparing meals, housekeeping, doing laundry, using transportation, managing medications and managing finances) [de-identified] : walks regularly [de-identified] : healthy - lots of fruit and veggies - red meat 1x per week  [[id number]] : 0 [AdvancecareDate] : 10/21/2022 [Change in mental status noted] : No change in mental status noted [Language] : denies difficulty with language [Reports changes in hearing] : Reports no changes in hearing [Reports changes in vision] : Reports no changes in vision [Reports normal functional visual acuity (ie: able to read med bottle)] : Reports poor functional visual acuity.  [Reports changes in dental health] : Reports no changes in dental health [Smoke Detector] : no smoke detector [Carbon Monoxide Detector] : no carbon monoxide detector [Safety elements used in home] : no safety elements used in home [Seat Belt] : does not use seat belt [TB Exposure] : is not being exposed to tuberculosis [BoneDensityDate] : 8/2023 [BoneDensityComments] : osteoporosis [ColonoscopyDate] : 1/2022 [de-identified] : wife [FreeTextEntry2] :  [de-identified] : uses b/l hearing aids, but can hear well without [de-identified] : s/p cataract surgery

## 2025-01-31 ENCOUNTER — APPOINTMENT (OUTPATIENT)
Dept: INTERNAL MEDICINE | Facility: CLINIC | Age: 89
End: 2025-01-31
Payer: MEDICARE

## 2025-01-31 PROCEDURE — 36415 COLL VENOUS BLD VENIPUNCTURE: CPT

## 2025-02-01 LAB
25(OH)D3 SERPL-MCNC: 18.7 NG/ML
ALBUMIN SERPL ELPH-MCNC: 4.3 G/DL
ALP BLD-CCNC: 98 U/L
ALT SERPL-CCNC: 35 U/L
ANION GAP SERPL CALC-SCNC: 11 MMOL/L
AST SERPL-CCNC: 19 U/L
BASOPHILS # BLD AUTO: 0.03 K/UL
BASOPHILS NFR BLD AUTO: 0.6 %
BILIRUB SERPL-MCNC: 0.7 MG/DL
BUN SERPL-MCNC: 8 MG/DL
CALCIUM SERPL-MCNC: 9.7 MG/DL
CHLORIDE SERPL-SCNC: 105 MMOL/L
CO2 SERPL-SCNC: 26 MMOL/L
CREAT SERPL-MCNC: 0.56 MG/DL
EGFR: 95 ML/MIN/1.73M2
EOSINOPHIL # BLD AUTO: 0.26 K/UL
EOSINOPHIL NFR BLD AUTO: 5.1 %
ESTIMATED AVERAGE GLUCOSE: 114 MG/DL
GLUCOSE SERPL-MCNC: 91 MG/DL
HBA1C MFR BLD HPLC: 5.6 %
HCT VFR BLD CALC: 43.3 %
HGB BLD-MCNC: 13.6 G/DL
IMM GRANULOCYTES NFR BLD AUTO: 0.2 %
LYMPHOCYTES # BLD AUTO: 1.81 K/UL
LYMPHOCYTES NFR BLD AUTO: 35.2 %
MAN DIFF?: NORMAL
MCHC RBC-ENTMCNC: 31.1 PG
MCHC RBC-ENTMCNC: 31.4 G/DL
MCV RBC AUTO: 99.1 FL
MONOCYTES # BLD AUTO: 0.51 K/UL
MONOCYTES NFR BLD AUTO: 9.9 %
NEUTROPHILS # BLD AUTO: 2.52 K/UL
NEUTROPHILS NFR BLD AUTO: 49 %
PLATELET # BLD AUTO: 195 K/UL
POTASSIUM SERPL-SCNC: 4 MMOL/L
PROT SERPL-MCNC: 7.5 G/DL
PSA SERPL-MCNC: <0.01 NG/ML
RBC # BLD: 4.37 M/UL
RBC # FLD: 12.6 %
SODIUM SERPL-SCNC: 142 MMOL/L
TSH SERPL-ACNC: 1.35 UIU/ML
WBC # FLD AUTO: 5.14 K/UL

## 2025-02-05 ENCOUNTER — NON-APPOINTMENT (OUTPATIENT)
Age: 89
End: 2025-02-05

## 2025-02-05 ENCOUNTER — APPOINTMENT (OUTPATIENT)
Dept: INTERNAL MEDICINE | Facility: CLINIC | Age: 89
End: 2025-02-05
Payer: MEDICARE

## 2025-02-05 VITALS
BODY MASS INDEX: 22.98 KG/M2 | OXYGEN SATURATION: 98 % | HEART RATE: 64 BPM | DIASTOLIC BLOOD PRESSURE: 77 MMHG | WEIGHT: 143 LBS | HEIGHT: 66 IN | SYSTOLIC BLOOD PRESSURE: 155 MMHG

## 2025-02-05 DIAGNOSIS — N40.0 BENIGN PROSTATIC HYPERPLASIA WITHOUT LOWER URINARY TRACT SYMPMS: ICD-10-CM

## 2025-02-05 DIAGNOSIS — C61 MALIGNANT NEOPLASM OF PROSTATE: ICD-10-CM

## 2025-02-05 DIAGNOSIS — I67.1 CEREBRAL ANEURYSM, NONRUPTURED: ICD-10-CM

## 2025-02-05 DIAGNOSIS — M81.0 AGE-RELATED OSTEOPOROSIS W/OUT CURRENT PATHOLOGICAL FRACTURE: ICD-10-CM

## 2025-02-05 PROCEDURE — G2211 COMPLEX E/M VISIT ADD ON: CPT

## 2025-02-05 PROCEDURE — G0444 DEPRESSION SCREEN ANNUAL: CPT | Mod: 59

## 2025-02-05 PROCEDURE — 99214 OFFICE O/P EST MOD 30 MIN: CPT | Mod: 25

## 2025-02-05 PROCEDURE — G0439: CPT

## 2025-02-05 PROCEDURE — 96401 CHEMO ANTI-NEOPL SQ/IM: CPT

## 2025-02-05 PROCEDURE — 93000 ELECTROCARDIOGRAM COMPLETE: CPT | Mod: 59

## 2025-02-06 RX ADMIN — DENOSUMAB 0 MG/ML: 60 INJECTION SUBCUTANEOUS at 00:00

## 2025-04-11 ENCOUNTER — RX RENEWAL (OUTPATIENT)
Age: 89
End: 2025-04-11

## 2025-06-02 ENCOUNTER — APPOINTMENT (OUTPATIENT)
Dept: OTOLARYNGOLOGY | Facility: CLINIC | Age: 89
End: 2025-06-02
Payer: MEDICARE

## 2025-06-02 VITALS — DIASTOLIC BLOOD PRESSURE: 77 MMHG | SYSTOLIC BLOOD PRESSURE: 141 MMHG | TEMPERATURE: 98 F | HEART RATE: 72 BPM

## 2025-06-02 DIAGNOSIS — H90.3 SENSORINEURAL HEARING LOSS, BILATERAL: ICD-10-CM

## 2025-06-02 DIAGNOSIS — H61.23 IMPACTED CERUMEN, BILATERAL: ICD-10-CM

## 2025-06-02 PROCEDURE — 92567 TYMPANOMETRY: CPT

## 2025-06-02 PROCEDURE — 99203 OFFICE O/P NEW LOW 30 MIN: CPT | Mod: 25

## 2025-06-02 PROCEDURE — 92557 COMPREHENSIVE HEARING TEST: CPT

## 2025-06-02 RX ORDER — DOXYCYCLINE HYCLATE 100 MG/1
100 CAPSULE ORAL
Qty: 10 | Refills: 0 | Status: ACTIVE | COMMUNITY
Start: 2025-03-24

## 2025-07-07 ENCOUNTER — APPOINTMENT (OUTPATIENT)
Dept: PHARMACY | Facility: CLINIC | Age: 89
End: 2025-07-07
Payer: SELF-PAY

## 2025-07-07 PROCEDURE — V5010 ASSESSMENT FOR HEARING AID: CPT | Mod: NC

## 2025-08-11 ENCOUNTER — APPOINTMENT (OUTPATIENT)
Dept: INTERNAL MEDICINE | Facility: CLINIC | Age: 89
End: 2025-08-11
Payer: MEDICARE

## 2025-08-11 PROCEDURE — 36415 COLL VENOUS BLD VENIPUNCTURE: CPT

## 2025-08-13 LAB
25(OH)D3 SERPL-MCNC: 53.3 NG/ML
ALBUMIN SERPL ELPH-MCNC: 3.7 G/DL
ALP BLD-CCNC: 52 U/L
ALT SERPL-CCNC: 23 U/L
ANION GAP SERPL CALC-SCNC: 10 MMOL/L
AST SERPL-CCNC: 28 U/L
BASOPHILS # BLD AUTO: 0.03 K/UL
BASOPHILS NFR BLD AUTO: 0.6 %
BILIRUB SERPL-MCNC: 0.4 MG/DL
BUN SERPL-MCNC: 17 MG/DL
CALCIUM SERPL-MCNC: 9.5 MG/DL
CHLORIDE SERPL-SCNC: 105 MMOL/L
CHOLEST SERPL-MCNC: 163 MG/DL
CO2 SERPL-SCNC: 25 MMOL/L
CREAT SERPL-MCNC: 0.97 MG/DL
EGFRCR SERPLBLD CKD-EPI 2021: 75 ML/MIN/1.73M2
EOSINOPHIL # BLD AUTO: 0.26 K/UL
EOSINOPHIL NFR BLD AUTO: 4.8 %
ESTIMATED AVERAGE GLUCOSE: 117 MG/DL
GLUCOSE SERPL-MCNC: 102 MG/DL
HBA1C MFR BLD HPLC: 5.7 %
HCT VFR BLD CALC: 38.7 %
HDLC SERPL-MCNC: 44 MG/DL
HGB BLD-MCNC: 12.4 G/DL
IMM GRANULOCYTES NFR BLD AUTO: 0.2 %
LDLC SERPL-MCNC: 104 MG/DL
LYMPHOCYTES # BLD AUTO: 1.77 K/UL
LYMPHOCYTES NFR BLD AUTO: 32.8 %
MAN DIFF?: NORMAL
MCHC RBC-ENTMCNC: 31.2 PG
MCHC RBC-ENTMCNC: 32 G/DL
MCV RBC AUTO: 97.5 FL
MONOCYTES # BLD AUTO: 0.54 K/UL
MONOCYTES NFR BLD AUTO: 10 %
NEUTROPHILS # BLD AUTO: 2.78 K/UL
NEUTROPHILS NFR BLD AUTO: 51.6 %
NONHDLC SERPL-MCNC: 120 MG/DL
PLATELET # BLD AUTO: 166 K/UL
POTASSIUM SERPL-SCNC: 4.3 MMOL/L
PROT SERPL-MCNC: 6.7 G/DL
PSA SERPL-MCNC: 0.19 NG/ML
RBC # BLD: 3.97 M/UL
RBC # FLD: 12.8 %
SODIUM SERPL-SCNC: 141 MMOL/L
TRIGL SERPL-MCNC: 79 MG/DL
TSH SERPL-ACNC: 3.41 UIU/ML
WBC # FLD AUTO: 5.39 K/UL

## 2025-08-18 ENCOUNTER — APPOINTMENT (OUTPATIENT)
Dept: INTERNAL MEDICINE | Facility: CLINIC | Age: 89
End: 2025-08-18
Payer: MEDICARE

## 2025-08-18 VITALS
DIASTOLIC BLOOD PRESSURE: 75 MMHG | SYSTOLIC BLOOD PRESSURE: 130 MMHG | HEIGHT: 66 IN | OXYGEN SATURATION: 95 % | WEIGHT: 142 LBS | HEART RATE: 75 BPM | BODY MASS INDEX: 22.82 KG/M2

## 2025-08-18 DIAGNOSIS — N40.1 BENIGN PROSTATIC HYPERPLASIA WITH LOWER URINARY TRACT SYMPMS: ICD-10-CM

## 2025-08-18 DIAGNOSIS — C61 MALIGNANT NEOPLASM OF PROSTATE: ICD-10-CM

## 2025-08-18 DIAGNOSIS — N52.9 MALE ERECTILE DYSFUNCTION, UNSPECIFIED: ICD-10-CM

## 2025-08-18 DIAGNOSIS — N40.0 BENIGN PROSTATIC HYPERPLASIA WITHOUT LOWER URINARY TRACT SYMPMS: ICD-10-CM

## 2025-08-18 DIAGNOSIS — M81.0 AGE-RELATED OSTEOPOROSIS W/OUT CURRENT PATHOLOGICAL FRACTURE: ICD-10-CM

## 2025-08-18 PROCEDURE — 99215 OFFICE O/P EST HI 40 MIN: CPT

## 2025-08-18 PROCEDURE — 96401 CHEMO ANTI-NEOPL SQ/IM: CPT
